# Patient Record
Sex: MALE | Race: WHITE | Employment: FULL TIME | URBAN - METROPOLITAN AREA
[De-identification: names, ages, dates, MRNs, and addresses within clinical notes are randomized per-mention and may not be internally consistent; named-entity substitution may affect disease eponyms.]

---

## 2020-04-29 ENCOUNTER — APPOINTMENT (OUTPATIENT)
Dept: GENERAL RADIOLOGY | Facility: CLINIC | Age: 20
End: 2020-04-29
Attending: EMERGENCY MEDICINE
Payer: COMMERCIAL

## 2020-04-29 ENCOUNTER — HOSPITAL ENCOUNTER (EMERGENCY)
Facility: CLINIC | Age: 20
Discharge: HOME | End: 2020-04-29
Attending: EMERGENCY MEDICINE | Admitting: EMERGENCY MEDICINE
Payer: COMMERCIAL

## 2020-04-29 VITALS
SYSTOLIC BLOOD PRESSURE: 120 MMHG | BODY MASS INDEX: 19.37 KG/M2 | TEMPERATURE: 98 F | RESPIRATION RATE: 18 BRPM | HEART RATE: 89 BPM | OXYGEN SATURATION: 100 % | WEIGHT: 155 LBS | DIASTOLIC BLOOD PRESSURE: 87 MMHG

## 2020-04-29 DIAGNOSIS — S93.401A SPRAIN OF RIGHT ANKLE, UNSPECIFIED LIGAMENT, INITIAL ENCOUNTER: ICD-10-CM

## 2020-04-29 PROCEDURE — 25000132 ZZH RX MED GY IP 250 OP 250 PS 637: Performed by: EMERGENCY MEDICINE

## 2020-04-29 PROCEDURE — 99283 EMERGENCY DEPT VISIT LOW MDM: CPT

## 2020-04-29 PROCEDURE — 73610 X-RAY EXAM OF ANKLE: CPT | Mod: RT

## 2020-04-29 RX ORDER — ACETAMINOPHEN 500 MG
1000 TABLET ORAL ONCE
Status: COMPLETED | OUTPATIENT
Start: 2020-04-29 | End: 2020-04-29

## 2020-04-29 RX ADMIN — ACETAMINOPHEN 1000 MG: 500 TABLET, FILM COATED ORAL at 22:23

## 2020-04-29 ASSESSMENT — ENCOUNTER SYMPTOMS
ARTHRALGIAS: 1
HEADACHES: 0

## 2020-04-29 NOTE — ED AVS SNAPSHOT
[facility]  [address]  [facility] [address]  Phone:  [phone]  Fax:  [phone]                                    Bautista Lam   MRN: [id number]    Department:  [facility]   Date of Visit:  4/29/2020           After Visit Summary Signature Page    I have received my discharge instructions, and my questions have been answered. I have discussed any challenges I see with this plan with the nurse or doctor.    ..........................................................................................................................................  Patient/Patient Representative Signature      ..........................................................................................................................................  Patient Representative Print Name and Relationship to Patient    ..................................................               ................................................  Date                                   Time    ..........................................................................................................................................  Reviewed by Signature/Title    ...................................................              ..............................................  Date                                               Time          22EPIC Rev 08/18

## 2020-04-30 NOTE — ED PROVIDER NOTES
History     Chief Complaint:  Ankle Pain    HPI   Bautista Lam is a 19 year old male who presents with right ankle pain. The patient states that he was skateboarding at 2030 this evening when he rolled his right ankle inward. The patient states that he was able to walk after the injury. He denies any loss of consciousness or head injury. The patient states he took Advil 1 hour prior to arrival.     No numb no head or loss of consciousness  Able to bear weight after    Allergies:  No Known Drug Allergies    Medications:    Medications reviewed. No current medications.     Past Medical History:    Medical history reviewed. No pertinent medical history.    Past Surgical History:    Orthopedic surgery     Family History:    Family history reviewed. No pertinent family history.     Social History:  Smoking Status: current everyday smoker   Marital Status:  Single     Review of Systems   Musculoskeletal: Positive for arthralgias.   Neurological: Negative for syncope and headaches.   All other systems reviewed and are negative.        Physical Exam     Patient Vitals for the past 24 hrs:   BP Temp Temp src Pulse Heart Rate Resp SpO2 Weight   04/29/20 2213 120/87 98  F (36.7  C) Oral 89 89 18 100 % 70.3 kg (155 lb)       Physical Exam  General: the patient is awake and interactive  HEENT:  Moist mucous membranes, conjunctiva normal  Pulmonary:  Normal respiratory effort  Cardiovascular:  Well perfused  Musculoskeletal:  Moving 4 extremities grossly wnl, no deformities  Right ankle - Tenderness over the lateral malleolus with swelling.  No medial malleolus tenderness.  Nontender at base of the 5th metatarsal.  No pain with midfoot squeeze.  No tenderness over proximal fibula.  5/5 strength dorsiflexion/plantar flexion.  Normal sensation to light touch in foot.  Capillary refill < 2 seconds, DP/PT pulse 2+  Neuro:  Speech normal, no focal deficits  Psych:  Normal affect    Emergency Department Course      Imaging:  Radiology findings were communicated with the patient who voiced understanding of the findings.    Ankle right 3 views:  Marked soft tissue swelling adjacent to the lateral malleolus. The bones are intact. No evidence for fracture. Ankle mortise preserved.  Reading per radiology    Interventions:  2223 Tylenol 1000 mg oral     Emergency Department Course:     Nursing notes and vitals reviewed.    2219 I performed an exam of the patient as documented above.     2228 The patient was sent for a XR while in the emergency department, results above.     2304 I personally reviewed the imaging results with the patient and answered all related questions prior to discharge.    Impression & Plan      Medical Decision Making:  Bautista Lam is a 19 year old male who presents for evaluation of right ankle pain after twisting his ankle while skateboarding tonight. Signs and symptoms are consistent with an ankle sprain.  There are no signs of fracture and xray is reassuring. The patient's neurovascular status is normal. The likelihood of other serious sequelae of trauma (spine, head, chest, abdomen, other extremities, pelvis) is low. I discussed ankle sprain treatment, which includes protected weightbearing, ice, rest, and a gel splint. Patient will follow up with primary care as discussed at bedside. Return to the Emergency Department indicated for numbness, weakness, or inability to tolerate the pain.     Diagnosis:    ICD-10-CM    1. Sprain of right ankle, unspecified ligament, initial encounter  S93.401A       Disposition:   Findings and plan explained to the Patient. Patient discharged home with instructions regarding supportive care, medications, and reasons to return. The importance of close follow-up was reviewed.     Scribe Disclosure:  REBEKAH, Magali Machado, am serving as a scribe at 10:19 PM on 4/29/2020 to document services personally performed by Antony Smith MD based on my observations and  the provider's statements to me.    [facility] EMERGENCY DEPARTMENT       Sarah, Antony Baumann MD  04/30/20 0126